# Patient Record
Sex: FEMALE | Race: OTHER | HISPANIC OR LATINO | URBAN - METROPOLITAN AREA
[De-identification: names, ages, dates, MRNs, and addresses within clinical notes are randomized per-mention and may not be internally consistent; named-entity substitution may affect disease eponyms.]

---

## 2018-07-28 ENCOUNTER — EMERGENCY (EMERGENCY)
Facility: HOSPITAL | Age: 33
LOS: 1 days | Discharge: HOME | End: 2018-07-28
Attending: EMERGENCY MEDICINE
Payer: SELF-PAY

## 2018-07-28 VITALS
TEMPERATURE: 99 F | RESPIRATION RATE: 16 BRPM | HEART RATE: 77 BPM | SYSTOLIC BLOOD PRESSURE: 119 MMHG | OXYGEN SATURATION: 98 % | DIASTOLIC BLOOD PRESSURE: 79 MMHG

## 2018-07-28 DIAGNOSIS — Z98.890 OTHER SPECIFIED POSTPROCEDURAL STATES: Chronic | ICD-10-CM

## 2018-07-28 LAB
APPEARANCE UR: CLEAR — SIGNIFICANT CHANGE UP
BILIRUB UR-MCNC: NEGATIVE — SIGNIFICANT CHANGE UP
COLOR SPEC: YELLOW — SIGNIFICANT CHANGE UP
DIFF PNL FLD: NEGATIVE — SIGNIFICANT CHANGE UP
GLUCOSE UR QL: NEGATIVE — SIGNIFICANT CHANGE UP
HCG UR QL: NEGATIVE — SIGNIFICANT CHANGE UP
KETONES UR-MCNC: NEGATIVE — SIGNIFICANT CHANGE UP
LEUKOCYTE ESTERASE UR-ACNC: NEGATIVE — SIGNIFICANT CHANGE UP
NITRITE UR-MCNC: NEGATIVE — SIGNIFICANT CHANGE UP
PH UR: 5 — SIGNIFICANT CHANGE UP (ref 5–8)
PROT UR-MCNC: NEGATIVE — SIGNIFICANT CHANGE UP
SP GR SPEC: 1.01 — SIGNIFICANT CHANGE UP (ref 1.01–1.02)
UROBILINOGEN FLD QL: NEGATIVE — SIGNIFICANT CHANGE UP

## 2018-07-28 PROCEDURE — 99282 EMERGENCY DEPT VISIT SF MDM: CPT

## 2018-07-28 PROCEDURE — 96372 THER/PROPH/DIAG INJ SC/IM: CPT

## 2018-07-28 PROCEDURE — 87591 N.GONORRHOEAE DNA AMP PROB: CPT

## 2018-07-28 PROCEDURE — 81025 URINE PREGNANCY TEST: CPT

## 2018-07-28 PROCEDURE — 81003 URINALYSIS AUTO W/O SCOPE: CPT

## 2018-07-28 PROCEDURE — 99283 EMERGENCY DEPT VISIT LOW MDM: CPT | Mod: 25

## 2018-07-28 PROCEDURE — 87491 CHLMYD TRACH DNA AMP PROBE: CPT

## 2018-07-28 RX ORDER — FLUCONAZOLE 150 MG/1
150 TABLET ORAL ONCE
Qty: 0 | Refills: 0 | Status: COMPLETED | OUTPATIENT
Start: 2018-07-28 | End: 2018-07-28

## 2018-07-28 RX ORDER — AZITHROMYCIN 500 MG/1
1000 TABLET, FILM COATED ORAL ONCE
Qty: 0 | Refills: 0 | Status: COMPLETED | OUTPATIENT
Start: 2018-07-28 | End: 2018-07-28

## 2018-07-28 RX ORDER — CEFTRIAXONE 500 MG/1
250 INJECTION, POWDER, FOR SOLUTION INTRAMUSCULAR; INTRAVENOUS ONCE
Qty: 0 | Refills: 0 | Status: COMPLETED | OUTPATIENT
Start: 2018-07-28 | End: 2018-07-28

## 2018-07-28 RX ORDER — METRONIDAZOLE 7.5 MG/G
1 GEL VAGINAL
Qty: 30 | Refills: 0 | OUTPATIENT
Start: 2018-07-28 | End: 2018-08-01

## 2018-07-28 RX ADMIN — AZITHROMYCIN 1000 MILLIGRAM(S): 500 TABLET, FILM COATED ORAL at 20:43

## 2018-07-28 RX ADMIN — FLUCONAZOLE 150 MILLIGRAM(S): 150 TABLET ORAL at 20:43

## 2018-07-28 RX ADMIN — CEFTRIAXONE 250 MILLIGRAM(S): 500 INJECTION, POWDER, FOR SOLUTION INTRAMUSCULAR; INTRAVENOUS at 20:43

## 2018-07-28 NOTE — ED ADULT NURSE NOTE - OBJECTIVE STATEMENT
Pt AOx3, ambulatory, c/o pelvic pain, painful urination, vaginal bleeding and discharge x 8 days. Pt denies n/v. Pt is sexually active.

## 2018-07-28 NOTE — ED PROVIDER NOTE - PROGRESS NOTE DETAILS
UA negative. Given history of unprotected sex and on exam brown discharge with friable cervix will treat prophylactically for STDs. Will dc with GYn follow up in 2-3 days. Pt is well appearing walking with steady gait, stable for discharge and follow up without fail with medical doctor. I had a detailed discussion with the patient and/or guardian regarding the historical points, exam findings, and any diagnostic results supporting the discharge diagnosis. Pt educated on care and need for follow up. Strict return instructions and red flag signs and symptoms discussed with patient. Questions answered. Pt shows understanding of discharge information and agrees to follow.

## 2018-07-28 NOTE — ED PROVIDER NOTE - MEDICAL DECISION MAKING DETAILS
32 y/o F pt presents with vaginal itching, brownish discharge and vaginal discomfort x8 days. Low suspicion of BV, possibly STD. Will give 1 dose of Diflucan and will treat with Azithromycin/Ceftraxone and DC with OB/GYN f/u.

## 2018-07-28 NOTE — ED PROVIDER NOTE - OBJECTIVE STATEMENT
32 y/o F pt with no significant PMHx and a PSHx of wrist surgery presents to the ED c/o burning urination, vaginal discharge, vaginal itchiness and vaginal discomfort x8 days. Pt denies fever, chills or any other complaints. NKDA. [id number]:   34 y/o F pt with no significant PMHx and a PSHx of wrist surgery presents to the ED c/o burning urination, vaginal discharge, vaginal itchiness and vaginal discomfort x8 days. Pt denies fever, chills or any other complaints. NKDA. [id number]:   34 y/o F pt with no significant PMHx and a PSHx of wrist surgery presents to the ED c/o burning urination, vaginal discharge (brown), vaginal itchiness and vaginal discomfort x8 days. Pt denies fever, chills, abdominal pain, back pain, dyspareunia or any other complaints. NKDA. History of 1 male sexual partner (unprotected sex). No history of STDs.

## 2018-07-28 NOTE — ED PROVIDER NOTE - ATTENDING CONTRIBUTION TO CARE
33 year old female c/o dysuria and vaginal discomfort x several days. PE: pelvic exam as per NP. I&P: STD vs. fungal; abx, antifungal, OB/GYN follow up 33 year old female c/o dysuria and vaginal discomfort x several days. PE: abd NTND, pelvic exam as per NP. I&P: STD vs. fungal; abx, antifungal, OB/GYN follow up.

## 2018-07-30 LAB
C TRACH RRNA SPEC QL NAA+PROBE: SIGNIFICANT CHANGE UP
N GONORRHOEA RRNA SPEC QL NAA+PROBE: SIGNIFICANT CHANGE UP
SPECIMEN SOURCE: SIGNIFICANT CHANGE UP

## 2019-05-02 NOTE — ED PROVIDER NOTE - CONSTITUTIONAL NEGATIVE STATEMENT, MLM
TRANSFER - OUT REPORT: 
 
Verbal report given to Jeyson Michele RN (name) on Aiden Ring  being transferred to [facility](unit) for routine progression of care Report consisted of patients Situation, Background, Assessment and  
Recommendations(SBAR). Information from the following report(s) SBAR, Kardex, Intake/Output and MAR was reviewed with the receiving nurse. Lines:  
Peripheral IV 05/01/19 Right; Anterior Forearm (Active) Site Assessment Clean, dry, & intact 5/2/2019  7:13 AM  
Phlebitis Assessment 0 5/2/2019  7:13 AM  
Infiltration Assessment 0 5/2/2019  7:13 AM  
Dressing Status Clean, dry, & intact 5/2/2019  7:13 AM  
Dressing Type Transparent 5/2/2019  7:13 AM  
Hub Color/Line Status Pink 5/2/2019  3:00 AM  
Alcohol Cap Used Yes 5/1/2019  7:23 PM  
  
 
Opportunity for questions and clarification was provided. Patient transported with: 
 Registered Nurse no fever and no chills.

## 2019-10-08 NOTE — ED PROVIDER NOTE - NS ED MD DISPO DISCHARGE
Bactrim Counseling:  I discussed with the patient the risks of sulfa antibiotics including but not limited to GI upset, allergic reaction, drug rash, diarrhea, dizziness, photosensitivity, and yeast infections.  Rarely, more serious reactions can occur including but not limited to aplastic anemia, agranulocytosis, methemoglobinemia, blood dyscrasias, liver or kidney failure, lung infiltrates or desquamative/blistering drug rashes. Home

## 2023-11-10 NOTE — ED PROVIDER NOTE - ATTESTATION, MLM
I have reviewed and confirmed nurses' notes for patient's medications, allergies, medical history, and surgical history. Yes